# Patient Record
Sex: FEMALE | Race: WHITE
[De-identification: names, ages, dates, MRNs, and addresses within clinical notes are randomized per-mention and may not be internally consistent; named-entity substitution may affect disease eponyms.]

---

## 2022-05-16 ENCOUNTER — HOSPITAL ENCOUNTER (OUTPATIENT)
Dept: HOSPITAL 47 - RADMAMWWP | Age: 77
Discharge: HOME | End: 2022-05-16
Attending: FAMILY MEDICINE
Payer: MEDICARE

## 2022-05-16 DIAGNOSIS — Z12.31: Primary | ICD-10-CM

## 2022-05-16 PROCEDURE — 77063 BREAST TOMOSYNTHESIS BI: CPT

## 2022-05-16 PROCEDURE — 77067 SCR MAMMO BI INCL CAD: CPT

## 2022-06-10 ENCOUNTER — HOSPITAL ENCOUNTER (OUTPATIENT)
Dept: HOSPITAL 47 - LABPAT | Age: 77
Discharge: HOME | End: 2022-06-10
Attending: ORTHOPAEDIC SURGERY
Payer: MEDICARE

## 2022-06-10 DIAGNOSIS — Z01.812: Primary | ICD-10-CM

## 2022-06-10 LAB
ALBUMIN SERPL-MCNC: 4.6 G/DL (ref 3.8–4.9)
ALBUMIN/GLOB SERPL: 2.15 G/DL (ref 1.6–3.17)
ALP SERPL-CCNC: 51 U/L (ref 41–126)
ALT SERPL-CCNC: 27 U/L (ref 8–44)
ANION GAP SERPL CALC-SCNC: 9.9 MMOL/L (ref 10–18)
APTT BLD: 25.5 SEC (ref 22–30)
AST SERPL-CCNC: 44 U/L (ref 13–35)
BUN SERPL-SCNC: 26.9 MG/DL (ref 9–27)
BUN/CREAT SERPL: 26.63 RATIO (ref 12–20)
CALCIUM SPEC-MCNC: 9.7 MG/DL (ref 8.7–10.3)
CHLORIDE SERPL-SCNC: 104 MMOL/L (ref 96–109)
CO2 SERPL-SCNC: 27.8 MMOL/L (ref 20–27.5)
ERYTHROCYTE [DISTWIDTH] IN BLOOD BY AUTOMATED COUNT: 4.71 X 10*6/UL (ref 4.1–5.2)
ERYTHROCYTE [DISTWIDTH] IN BLOOD: 14 % (ref 11.5–14.5)
GLOBULIN SER CALC-MCNC: 2.1 G/DL (ref 1.6–3.3)
GLUCOSE SERPL-MCNC: 134 MG/DL (ref 70–110)
HCT VFR BLD AUTO: 44.3 % (ref 37.2–46.3)
HGB BLD-MCNC: 13.7 G/DL (ref 12–15)
INR PPP: 1.1 (ref ?–1.2)
MCH RBC QN AUTO: 29.1 PG (ref 27–32)
MCHC RBC AUTO-ENTMCNC: 30.9 G/DL (ref 32–37)
MCV RBC AUTO: 94.1 FL (ref 80–97)
NRBC BLD AUTO-RTO: 0 /100 WBCS (ref 0–0)
PH UR: 5 [PH] (ref 5–8)
PLATELET # BLD AUTO: 200 X 10*3/UL (ref 140–440)
POTASSIUM SERPL-SCNC: 4.2 MMOL/L (ref 3.5–5.5)
PROT SERPL-MCNC: 6.7 G/DL (ref 6.2–8.2)
PT BLD: 11.7 SEC (ref 9–12)
SODIUM SERPL-SCNC: 142 MMOL/L (ref 135–145)
SP GR UR: 1.03 (ref 1–1.03)
UROBILINOGEN UR QL: 1
WBC # BLD AUTO: 5.77 X 10*3/UL (ref 4.5–10)

## 2022-06-10 PROCEDURE — 80053 COMPREHEN METABOLIC PANEL: CPT

## 2022-06-10 PROCEDURE — 36415 COLL VENOUS BLD VENIPUNCTURE: CPT

## 2022-06-10 PROCEDURE — 93005 ELECTROCARDIOGRAM TRACING: CPT

## 2022-06-10 PROCEDURE — 85730 THROMBOPLASTIN TIME PARTIAL: CPT

## 2022-06-10 PROCEDURE — 85027 COMPLETE CBC AUTOMATED: CPT

## 2022-06-10 PROCEDURE — 85610 PROTHROMBIN TIME: CPT

## 2022-06-10 PROCEDURE — 81001 URINALYSIS AUTO W/SCOPE: CPT

## 2022-06-10 PROCEDURE — 87070 CULTURE OTHR SPECIMN AEROBIC: CPT

## 2022-06-16 NOTE — MM
Reason for Exam: Screening  (asymptomatic). 





Patient History: 

Menarche at age 14. First Full-Term Pregnancy at age 27. Hysterectomy at age 47. Postmenopausal.

Breast cancer, right, age 58.

Mother had breast cancer. Sister had breast cancer. 





Tissue Density: 

The breast tissue is heterogeneously dense. This may lower the sensitivity of mammography.





Findings: 

Analyzed By CAD. 

Spiculated mass/distortion posterior upper-outer quadrant right breast contains a microclip from

prior biopsy. We suspect that this represents the patient's lumpectomy scar. No priors are available

to confirm. Areas of fat necrosis calcification centrally anterior right breast are benign.

Additional benign loosely grouped/regional round calcifications central left breast middle to

posterior depth.



There is nodular area of asymmetric density superior left MLO view without clear correlate on the CC

view for which further evaluation is recommended. 





Overall Assessment: Incomplete: need additional imaging evaluation, BI-RAD 0





Management: 

Special View Mammogram of the left breast.

Diagnostic Breast Ultrasound of the right breast.

1. Additional views left breast to include spot 3-D MLO and 3-D lateral views. Targeted left breast

ultrasound if any persisting abnormality.

2. Targeted right breast ultrasound upper outer quadrant for the spiculated mass/distortion,

suspected lumpectomy scar (but no priors available to confirm).



Electronically signed and approved by: Juli Thompson M.D. Radiologist

## 2022-06-21 ENCOUNTER — HOSPITAL ENCOUNTER (OUTPATIENT)
Dept: HOSPITAL 47 - OR | Age: 77
Setting detail: OBSERVATION
LOS: 3 days | Discharge: NURSING, REHAB OR LONG-TERM CARE | End: 2022-06-24
Attending: ORTHOPAEDIC SURGERY | Admitting: ORTHOPAEDIC SURGERY
Payer: MEDICARE

## 2022-06-21 DIAGNOSIS — Z80.3: ICD-10-CM

## 2022-06-21 DIAGNOSIS — D50.0: ICD-10-CM

## 2022-06-21 DIAGNOSIS — Z87.891: ICD-10-CM

## 2022-06-21 DIAGNOSIS — Z79.02: ICD-10-CM

## 2022-06-21 DIAGNOSIS — R33.9: ICD-10-CM

## 2022-06-21 DIAGNOSIS — I10: ICD-10-CM

## 2022-06-21 DIAGNOSIS — Z83.3: ICD-10-CM

## 2022-06-21 DIAGNOSIS — Z79.899: ICD-10-CM

## 2022-06-21 DIAGNOSIS — F32.A: ICD-10-CM

## 2022-06-21 DIAGNOSIS — E78.5: ICD-10-CM

## 2022-06-21 DIAGNOSIS — I69.351: ICD-10-CM

## 2022-06-21 DIAGNOSIS — Z90.49: ICD-10-CM

## 2022-06-21 DIAGNOSIS — J44.9: ICD-10-CM

## 2022-06-21 DIAGNOSIS — Z85.3: ICD-10-CM

## 2022-06-21 DIAGNOSIS — M16.11: Primary | ICD-10-CM

## 2022-06-21 DIAGNOSIS — M25.751: ICD-10-CM

## 2022-06-21 DIAGNOSIS — Z90.710: ICD-10-CM

## 2022-06-21 DIAGNOSIS — Z98.41: ICD-10-CM

## 2022-06-21 DIAGNOSIS — E11.9: ICD-10-CM

## 2022-06-21 DIAGNOSIS — Z88.8: ICD-10-CM

## 2022-06-21 DIAGNOSIS — Z96.649: ICD-10-CM

## 2022-06-21 DIAGNOSIS — Z82.49: ICD-10-CM

## 2022-06-21 DIAGNOSIS — Z79.84: ICD-10-CM

## 2022-06-21 DIAGNOSIS — Z79.01: ICD-10-CM

## 2022-06-21 DIAGNOSIS — Z87.440: ICD-10-CM

## 2022-06-21 DIAGNOSIS — Z98.42: ICD-10-CM

## 2022-06-21 DIAGNOSIS — Z80.0: ICD-10-CM

## 2022-06-21 DIAGNOSIS — Z96.641: ICD-10-CM

## 2022-06-21 LAB
GLUCOSE BLD-MCNC: 216 MG/DL (ref 70–110)
GLUCOSE BLD-MCNC: 224 MG/DL (ref 70–110)
GLUCOSE BLD-MCNC: 254 MG/DL (ref 70–110)
GLUCOSE BLD-MCNC: 314 MG/DL (ref 70–110)
GLUCOSE BLD-MCNC: 360 MG/DL (ref 70–110)

## 2022-06-21 PROCEDURE — 86901 BLOOD TYPING SEROLOGIC RH(D): CPT

## 2022-06-21 PROCEDURE — 94640 AIRWAY INHALATION TREATMENT: CPT

## 2022-06-21 PROCEDURE — 88305 TISSUE EXAM BY PATHOLOGIST: CPT

## 2022-06-21 PROCEDURE — 73501 X-RAY EXAM HIP UNI 1 VIEW: CPT

## 2022-06-21 PROCEDURE — 97535 SELF CARE MNGMENT TRAINING: CPT

## 2022-06-21 PROCEDURE — 88311 DECALCIFY TISSUE: CPT

## 2022-06-21 PROCEDURE — 86900 BLOOD TYPING SEROLOGIC ABO: CPT

## 2022-06-21 PROCEDURE — 27130 TOTAL HIP ARTHROPLASTY: CPT

## 2022-06-21 PROCEDURE — 97116 GAIT TRAINING THERAPY: CPT

## 2022-06-21 PROCEDURE — 71046 X-RAY EXAM CHEST 2 VIEWS: CPT

## 2022-06-21 PROCEDURE — 97161 PT EVAL LOW COMPLEX 20 MIN: CPT

## 2022-06-21 PROCEDURE — 86850 RBC ANTIBODY SCREEN: CPT

## 2022-06-21 PROCEDURE — 97530 THERAPEUTIC ACTIVITIES: CPT

## 2022-06-21 PROCEDURE — 97166 OT EVAL MOD COMPLEX 45 MIN: CPT

## 2022-06-21 PROCEDURE — 94760 N-INVAS EAR/PLS OXIMETRY 1: CPT

## 2022-06-21 PROCEDURE — 85025 COMPLETE CBC W/AUTO DIFF WBC: CPT

## 2022-06-21 RX ADMIN — GLIMEPIRIDE SCH MG: 2 TABLET ORAL at 16:15

## 2022-06-21 RX ADMIN — POTASSIUM CHLORIDE SCH MLS: 14.9 INJECTION, SOLUTION INTRAVENOUS at 08:46

## 2022-06-21 RX ADMIN — DOCUSATE SODIUM AND SENNOSIDES SCH EACH: 50; 8.6 TABLET ORAL at 21:11

## 2022-06-21 RX ADMIN — GLIMEPIRIDE SCH MG: 2 TABLET ORAL at 22:25

## 2022-06-21 RX ADMIN — ATORVASTATIN CALCIUM SCH MG: 80 TABLET, FILM COATED ORAL at 21:11

## 2022-06-21 RX ADMIN — INSULIN ASPART SCH UNIT: 100 INJECTION, SOLUTION INTRAVENOUS; SUBCUTANEOUS at 21:11

## 2022-06-21 RX ADMIN — INSULIN ASPART SCH UNIT: 100 INJECTION, SOLUTION INTRAVENOUS; SUBCUTANEOUS at 17:23

## 2022-06-21 NOTE — P.CONS
History of Present Illness





- Reason for Consult


Consult date: 06/21/22


Medical management


Requesting physician: Manuel Grant





- Chief Complaint


Right hip surgery





- History of Present Illness





This is a pleasant 75-year-old patient follows with Dr. Junior.  Chronic stable 

medical conditions include right-sided weakness and right foot drag from prior 

stroke, diabetes, hypertension, hyperlipidemia, osteoarthritis.


Patient has undergone right total hip arthroplasty.  Pain control.  No nausea 

vomiting.  No chest pain or shortness of breath.  Laying in bed.





Review of systems:


GEN.:  Tired


EYES: None


HEENT: None


NECK: None


RESPIRATORY: None


CARDIOVASCULAR: None


GASTROINTESTINAL: None


GENITOURINARY: None


MUSCULOSKELETAL: Joint pains


LYMPHATICS: None


HEMATOLOGICAL: None  


PSYCHIATRY: None


NEUROLOGICAL: Right leg weakness from prior stroke





Past medical history to include:


3 strokes loss and in 2010 resulting in some right-sided weakness right foot 

drag, diabetes, hypertension, hyperlipidemia, right breast cancer, 

osteoarthritis,





Social history:


Ex-smoker.  No alcohol.  .





Family history of colon


CAD, diabetes, breast cancer





Physical examination:


VITAL SIGNS: 98, 69, one or 2 x 67, 95% on 2 L


GENERAL: BMI 31.2, sitting on bed, awake not in distress.


EYES: Pupils equal.  Conjunctiva normal.


HEENT: External appearance of nose and ears normal, oral cavity grossly normal.


NECK: JVD not raised; masses not palpable.


HEART: First and second heart sounds are normal;  no edema.  


LUNGS: Respiratory rate normal; clear to auscultation.  


ABDOMEN: Soft,  nontender, liver spleen not palpable, no masses palpable.  


PSYCH: Alert and oriented x3;  mood  and affect normal.  


MUSCULOSKELETAL:No Clubbing/cyanosis;muscles-grossly intact.  Dressing over the 

right hip incision site.  Evidence of OA.


NEUROLOGICAL: [Cranial nerves grossly intact; no facial asymmetry, right leg 

weakness.


LYMPHATICS: No lymph nodes palpable in the axilla and neck





INVESTIGATIONS, reviewed in the clinical context:


Blood work from 06/10/2022:


White count 5.7 hemoglobin 13.7 platelets 200 potassium 4.2 creatinine 1.0





Assessment and plan:





-Right total hip arthroplasty with anterior approach


DVT prophylaxis.  Pain control.





-Hyperlipidemia


Crestor 40 mg daily at bedtime





-Essential hypertension


Metoprolol 25 mg daily, Zestoretic 20/12.5 one tablet daily





-Diabetes mellitus type 2


Amaryl 2 mg by mouth twice a day, trulicity 0.75 mg subcu on Sunday





-Depression


Celexa 20 mg daily





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


Patient on Plavix





Resume home medications.  Follow Accu-Cheks.  DVT prophylaxis.  Pain control in 

place.  Hold of his Zestoretic in the morning his blood pressure running on the 

lower side.  Care was discussed with the patient and questions answered.


Thank you Dr. Grant





Past Medical History


Past Medical History: Cancer, CVA/TIA, Diabetes Mellitus, Hyperlipidemia, 

Hypertension


Additional Past Medical History / Comment(s): 3 strokes, last 2010 rt sided 

weakness. rt foot drag.  breast CA right.  hx UTI.  some arthritis in her hip.  

enlarged ankles.


History of Any Multi-Drug Resistant Organisms: None Reported


Past Surgical History: Bladder Surgery, Cholecystectomy, Hysterectomy, Joint 

Replacement


Additional Past Surgical History / Comment(s): lt hip replaced.  lumpectomy rt 

breast ( finished oral chemo and radiation).  Bladder suspension.  cataracts 

removed both eyes,.  Right JELENA (6/21/22)


Past Anesthesia/Blood Transfusion Reactions: No Reported Reaction


Past Psychological History: Depression


Smoking Status: Former smoker


Past Alcohol Use History: None Reported


Past Drug Use History: None Reported





- Past Family History


  ** Mother


Family Medical History: Cancer, Coronary Artery Disease (CAD), Diabetes Mellitus


Additional Family Medical History / Comment(s): Breast





  ** Father


Family Medical History: Coronary Artery Disease (CAD), Diabetes Mellitus


Additional Family Medical History / Comment(s): pacemaker





Medications and Allergies


                                Home Medications











 Medication  Instructions  Recorded  Confirmed  Type


 


Citalopram Hydrobromide 20 mg PO DAILY 06/17/22 06/21/22 History





[Citalopram HBr]    


 


Clopidogrel [Plavix] 75 mg PO DAILY 06/17/22 06/17/22 History


 


Dulaglutide [Trulicity] 0.75 mg SQ LORA 06/17/22 06/21/22 History


 


Fenofibrate 160 mg PO DAILY 06/17/22 06/21/22 History


 


Glimepiride [Amaryl] 2 mg PO BID 06/17/22 06/21/22 History


 


Lisinopril-Hctz 20-12.5 mg 1 tab PO DAILY 06/17/22 06/21/22 History





[Zestoretic 20-12.5]    


 


Metoprolol Tartrate 25 mg PO DAILY 06/17/22 06/21/22 History


 


Multivitamin [Multivitamins Adult 1 each PO DAILY 06/17/22 06/17/22 History





Gummies]    


 


Rosuvastatin [Crestor] 40 mg PO HS 06/17/22 06/21/22 History


 


Enoxaparin [Lovenox] 40 mg SQ Q12H 06/21/22 06/21/22 History


 


HYDROcodone/APAP 5-325MG [Norco 1 - 2 tab PO Q6HR PRN 7 Days #30 06/21/22  Rx





5-325] tab   


 


Ondansetron Odt [Zofran Odt] 4 mg PO Q8HR PRN #10 tab 06/21/22  Rx


 


Sennosides-Docusate Sodium 2 tab PO HS PRN #30 tablet 06/21/22  Rx





[Senokot-S]    








                                    Allergies











Allergy/AdvReac Type Severity Reaction Status Date / Time


 


metformin AdvReac  Diarrhea Verified 06/17/22 09:36














Physical Exam


Vitals: 


                                   Vital Signs











  Temp Pulse Pulse Resp BP Pulse Ox


 


 06/21/22 15:12       95


 


 06/21/22 14:00  98.0 F   69   102/67  95


 


 06/21/22 12:57    67  16  111/54  97


 


 06/21/22 12:27    67  16  96/52  97


 


 06/21/22 12:12    69  16  117/56  97


 


 06/21/22 11:57    71  16  152/62  95


 


 06/21/22 11:42    76  16  156/64  94 L


 


 06/21/22 11:27    80  16  155/65  94 L


 


 06/21/22 11:12  96.9 F L   86  16  150/58  91 L


 


 06/21/22 08:39  98.0 F  58 L   16  169/70  94 L








                                Intake and Output











 06/21/22 06/21/22 06/21/22





 06:59 14:59 22:59


 


Intake Total  1251 


 


Output Total  350 


 


Balance  901 


 


Intake:   


 


  IV  1251 


 


Output:   


 


  Estimated Blood Loss  350 


 


Other:   


 


  Weight  77.3 kg 














Results


Labs: 


                  Abnormal Lab Results - Last 24 Hours (Table)











  06/21/22 06/21/22 06/21/22 Range/Units





  08:43 13:10 13:57 


 


POC Glucose (mg/dL)  216 H  314 H  360 H  ()  mg/dL

## 2022-06-21 NOTE — XR
EXAMINATION TYPE: XR Hip Limited RT

 

DATE OF EXAM: 6/21/2022

 

COMPARISON: NONE

 

HISTORY: Postop

 

TECHNIQUE: One view submitted.

 

FINDINGS:

There is postsurgical change in near anatomic alignment.  There is soft tissue edema and emphysema. 

 

IMPRESSION:

1. Postoperative change.  Appears in near-anatomic alignment.

## 2022-06-21 NOTE — FL
EXAMINATION TYPE: FL guidance operating room

 

DATE OF EXAM: 6/21/2022

 

HISTORY: Fluoroscopy  time

 

20 seconds of fluoroscopy provided. 

 

IMPRESSION:

1. Fluoroscopy time.

## 2022-06-21 NOTE — P.OP
Date of Procedure: 06/21/22


Preoperative Diagnosis: 


Severe osteoarthritis right hip


Postoperative Diagnosis: 


Severe osteoarthritis right hip


Procedure(s) Performed: 


Right total hip arthroplasty with a direct anterior approach


Implants: 


Smith [employer] Nephew Polarstem standard size 3 Colar


Villalpando & Nephew R3, 3 hole hemispherical acetabular shell, 48 mm


Smith [employer] Nephew Reflection 6.5 mm cancellus screw, 20 mm, 25 mm


[employer]hew R3, XLPE 20 acetabular liner 


[employer] Nephew Oxinium femoral head 32 m, +0


All components were press-fit.


The articulation is Oxinium on polyethylene.


Anesthesia: GETA


Surgeon: Manuel Grant


Assistant #1: Homero Brunson


Estimated Blood Loss (ml): 350


Pathology: other (Bone and cartilage)


Condition: stable


Disposition: PACU


Indications for Procedure: 


After failure of conservative treatment we discussed the surgical and 

nonsurgical treatment options at length.  Patient wishes to proceed with a total

hip arthroplasty with a direct anterior approach.  Complications specific to 

this procedure were discussed at length, including but not limited to infection,

leg length discrepancy, dislocation, nerve injury, and fracture.  Covid-19 was 

also discussed at length with the patient, and they are aware of the current 

policies and procedures.  The patient was given the option of delaying surgery, 

but they elect to proceed knowing these risks.  Patient is aware of all these 

complications and informed consent was obtained


Operative Findings: 


The operative findings are consistent with severe osteoarthritis of the right 

hip


Description of Procedure: 


Patient was seen and evaluated in the preoperative area and the consent was 

reviewed.  The operative site was marked with a skin marker.  The patient was 

then brought to the operating room and given preoperative antibiotics 

intravenously.  1 g of Tranexamic acid was also given intravenously.  A general 

anesthetic was administered by the anesthesia department.   The patient was then

placed on the H[place] table with the bony prominences well-padded.  The hip area 

was then prepped with a ChloraPrep solution and draped in the usual sterile 

fashion.





A universal timeout was then performed, which confirmed the patient's name, 

surgical site, ALLERGIES, and procedure being performed on the consent.  Next 

the incision site was located at 1 cm distal and 2 cm lateral to the anterior 

superior iliac spine.  The skin and subcutaneous tissues were sharply incised.  

Incision was carefully dissected down to the fascia overlying the tensor fascia 

aisha muscle.  This fascia was then incised in line with the incision.  Care was 

taken to stay laterally in order to avoid injuring the lateral femoral cutaneous

nerve.  Next, using blunt finger dissection, the tensor fascia aisha muscle was 

dissected off its investing fascia.  The muscle was then carefully retracted 

laterally with a cobra retractor over the lateral neck of the femur.  Next, the 

circumflex vessels were identified and cauterized using the AquaMantis device.  

The anterior hip capsule was then exposed.  The capsule was then opened and an 

inverted T fashion.  Cobra retractors were then placed intracapsularly.  The 

retractors were maintained intracapsular throughout the procedure.  The proximal

femur was then visualized.  Fluoroscopic x-rays were then taken in order to 

evaluate the preoperative leg lengths.  A small amount of traction was placed on

the leg.





The femoral neck was then osteotomized at the appropriate level above the lesser

trochanter.  A small wedge of bone was then removed from the remaining femoral 

head.  Next, using a corkscrew the femoral head was removed from the acetabulum.

 On gross visual inspection, the femoral head had complete loss of articular 

cartilage and multiple periarticular osteophytes.  The femoral head was then 

measured.  Attention was then turned to the acetabulum.





The acetabulum was exposed and any remaining labrum was excised.  Sequential 

reaming of the acetabulum was performed using fluoroscopic guidance until there 

was a good bed of bleeding cancellus bone.  When the appropriate size was 

reached, a trial was then placed.  The position and fit of the trial was checked

with fluoroscopy.  The trial was then removed.  Then, using fluoroscopic 

guidance, the final implant was impacted at 20 of anteversion and 40 of 

abduction, and fully seated in the acetabulum.  2 screws were then placed in the

acetabulum.  Again fluoroscopy was used to check position of the screws.  Next, 

the liner was then impacted, with a 20 elevated liner located in the anterior 

superior quadrant.  Component locking was confirmed.





Attention was then directed to the femur.  With the aid of the Houlton table, the 

femur was externally rotated to approximately 130, extended, and adducted under

the opposite leg.  A side hook was then placed under the proximal femur, and the

side hook elevator was used to elevate the proximal femur while releasing the 

capsule.  Retractors were then placed.  A capsular release was performed, as 

well as a release of the conjoined tendon, which afforded excellent 

visualization of the proximal femur.  Next, a box osteotome was used to 

lateralize the proximal femur.  A  was then used to locate the 

femoral canal.  Sequential broaching was then performed with appropriate size 

which afforded excellent fixation in the proximal femur.  A trial was then 

placed with appropriate head and neck, and the hip was gently reduced with the 

aid of the Houlton table.  Fluoroscopy was then used to check position of the 

components, as well as to ensure equal leg lengths.  The hip was then gently 

dislocated and the trials were then removed.  Final implants were then impacted 

and the hip was again reduced.  Final fluoroscopic x-rays confirmed that the 

components were in anatomic position, as well as equal leg lengths.  The hip was

also taken through range of motion, and found to be stable.





The hip was then copiously irrigated with antibiotic solution with pulsatile 

lavage.  The hip was then irrigated with Irrisept solution.  The soft tissues 

were then injected with a ropivacaine solution.  A second dose of 1 g of 

Tranexamic acid was also given intravenously. 





The fascia was then closed with 2-0 strata fix suture.  The subcutaneous tissue 

was closed with 3-0 Vicryl.  The subcuticular tissue was closed with 3-0 strata 

fix suture.  The skin was then closed with Exofin skin glue.  After the glue and

dried, and Optifoam silver impregnated dressing was applied.  The patient was 

then transferred to the recovery room in stable condition.





The assistant  PATRICIO Arshad was required due to the complexity of surgery, 

and the need for skilled surgical assistant for positioning, draping, exposure, 

retraction, and closure of the wound.

## 2022-06-21 NOTE — XR
EXAMINATION TYPE: XR Hip Limited RT

 

DATE OF EXAM: 6/21/2022

 

COMPARISON: NONE

 

HISTORY: Pain

 

TECHNIQUE: One view submitted.

 

FINDINGS:

There is postsurgical change in near anatomic alignment.  There is soft tissue edema and emphysema. 

 

IMPRESSION:

1. Postoperative change.  Appears in near-anatomic alignment.

## 2022-06-22 LAB
BASOPHILS # BLD AUTO: 0.02 X 10*3/UL (ref 0–0.1)
BASOPHILS NFR BLD AUTO: 0.2 %
EOSINOPHIL # BLD AUTO: 0 X 10*3/UL (ref 0.04–0.35)
EOSINOPHIL NFR BLD AUTO: 0 %
ERYTHROCYTE [DISTWIDTH] IN BLOOD BY AUTOMATED COUNT: 3.49 X 10*6/UL (ref 4.1–5.2)
ERYTHROCYTE [DISTWIDTH] IN BLOOD: 14.5 % (ref 11.5–14.5)
GLUCOSE BLD-MCNC: 206 MG/DL (ref 70–110)
GLUCOSE BLD-MCNC: 266 MG/DL (ref 70–110)
GLUCOSE BLD-MCNC: 292 MG/DL (ref 70–110)
GLUCOSE BLD-MCNC: 303 MG/DL (ref 70–110)
HCT VFR BLD AUTO: 32.5 % (ref 37.2–46.3)
HGB BLD-MCNC: 10.4 G/DL (ref 12–15)
IMM GRANULOCYTES BLD QL AUTO: 0.5 %
LYMPHOCYTES # SPEC AUTO: 0.91 X 10*3/UL (ref 0.9–5)
LYMPHOCYTES NFR SPEC AUTO: 8.3 %
MCH RBC QN AUTO: 29.8 PG (ref 27–32)
MCHC RBC AUTO-ENTMCNC: 32 G/DL (ref 32–37)
MCV RBC AUTO: 93.1 FL (ref 80–97)
MONOCYTES # BLD AUTO: 0.85 X 10*3/UL (ref 0.2–1)
MONOCYTES NFR BLD AUTO: 7.7 %
NEUTROPHILS # BLD AUTO: 9.17 X 10*3/UL (ref 1.8–7.7)
NEUTROPHILS NFR BLD AUTO: 83.3 %
NRBC BLD AUTO-RTO: 0 /100 WBCS (ref 0–0)
PLATELET # BLD AUTO: 176 X 10*3/UL (ref 140–440)
WBC # BLD AUTO: 11.01 X 10*3/UL (ref 4.5–10)

## 2022-06-22 RX ADMIN — Medication SCH MG: at 20:06

## 2022-06-22 RX ADMIN — HYDROCODONE BITARTRATE AND ACETAMINOPHEN PRN EACH: 5; 325 TABLET ORAL at 20:07

## 2022-06-22 RX ADMIN — HYDROCODONE BITARTRATE AND ACETAMINOPHEN PRN EACH: 5; 325 TABLET ORAL at 13:18

## 2022-06-22 RX ADMIN — INSULIN ASPART SCH UNIT: 100 INJECTION, SOLUTION INTRAVENOUS; SUBCUTANEOUS at 08:57

## 2022-06-22 RX ADMIN — METOPROLOL TARTRATE SCH MG: 25 TABLET, FILM COATED ORAL at 08:57

## 2022-06-22 RX ADMIN — ENOXAPARIN SODIUM SCH MG: 30 INJECTION SUBCUTANEOUS at 19:53

## 2022-06-22 RX ADMIN — ONDANSETRON PRN MG: 2 INJECTION INTRAMUSCULAR; INTRAVENOUS at 17:12

## 2022-06-22 RX ADMIN — POTASSIUM CHLORIDE SCH: 14.9 INJECTION, SOLUTION INTRAVENOUS at 08:46

## 2022-06-22 RX ADMIN — ONDANSETRON PRN MG: 2 INJECTION INTRAMUSCULAR; INTRAVENOUS at 08:46

## 2022-06-22 RX ADMIN — INSULIN ASPART SCH UNIT: 100 INJECTION, SOLUTION INTRAVENOUS; SUBCUTANEOUS at 22:35

## 2022-06-22 RX ADMIN — ONDANSETRON PRN MG: 2 INJECTION INTRAMUSCULAR; INTRAVENOUS at 01:53

## 2022-06-22 RX ADMIN — ATORVASTATIN CALCIUM SCH MG: 80 TABLET, FILM COATED ORAL at 19:53

## 2022-06-22 RX ADMIN — THERA TABS SCH EACH: TAB at 08:57

## 2022-06-22 RX ADMIN — HYDROCODONE BITARTRATE AND ACETAMINOPHEN PRN EACH: 5; 325 TABLET ORAL at 07:47

## 2022-06-22 RX ADMIN — FENOFIBRATE SCH MG: 160 TABLET ORAL at 08:57

## 2022-06-22 RX ADMIN — DOCUSATE SODIUM AND SENNOSIDES SCH EACH: 50; 8.6 TABLET ORAL at 19:53

## 2022-06-22 RX ADMIN — INSULIN ASPART SCH UNIT: 100 INJECTION, SOLUTION INTRAVENOUS; SUBCUTANEOUS at 11:47

## 2022-06-22 RX ADMIN — CITALOPRAM HYDROBROMIDE SCH MG: 20 TABLET ORAL at 08:57

## 2022-06-22 RX ADMIN — INSULIN ASPART SCH UNIT: 100 INJECTION, SOLUTION INTRAVENOUS; SUBCUTANEOUS at 17:11

## 2022-06-22 RX ADMIN — GLIMEPIRIDE SCH MG: 2 TABLET ORAL at 19:53

## 2022-06-22 RX ADMIN — TAMSULOSIN HYDROCHLORIDE SCH MG: 0.4 CAPSULE ORAL at 11:47

## 2022-06-22 RX ADMIN — GLIMEPIRIDE SCH MG: 2 TABLET ORAL at 09:26

## 2022-06-22 NOTE — P.PN
Subjective


Progress Note Date: 06/22/22


Principal diagnosis: 


Primary osteoarthritis right hip.


Status post total right hip arthroplasty with direct anterior approach.





This is a 76-year-old female who is postoperative day #1 status post total hip 

arthroplasty was direct anterior approach.  She states that she has been unable 

to void.  Patient states that she has only voided about 25 cc last shift.  They 

have not yet straight cathed her.  Vital signs are stable.  She is afebrile.








Objective





- Vital Signs


Vital signs: 


                                   Vital Signs











Temp  98.4 F   06/22/22 01:13


 


Pulse  80   06/22/22 01:13


 


Resp  20   06/22/22 01:13


 


BP  111/65   06/22/22 01:13


 


Pulse Ox  96   06/22/22 01:13


 


FiO2      








                                 Intake & Output











 06/21/22 06/22/22 06/22/22





 18:59 06:59 18:59


 


Intake Total 1251  


 


Output Total 350  400


 


Balance 901  -400


 


Weight 77.3 kg  


 


Intake:   


 


  IV 1251  


 


Output:   


 


  Urine   400


 


    Straight   400


 


  Estimated Blood Loss 350  


 


Other:   


 


  # Voids 1  














- Exam


This is a pleasant 76-year-old female in no acute distress.  She is alert and 

oriented 3.  Exam of the right hip reveals that her dressings clean, dry and 

intact.  She has full foot and ankle motion without difficulty or pain.  

Neurovascular status to the right lower extremity is intact.








- Labs


Labs: 


                  Abnormal Lab Results - Last 24 Hours (Table)











  06/21/22 06/21/22 06/21/22 Range/Units





  08:43 13:10 13:57 


 


POC Glucose (mg/dL)  216 H  314 H  360 H  ()  mg/dL














  06/21/22 06/21/22 06/22/22 Range/Units





  17:06 20:16 06:55 


 


POC Glucose (mg/dL)  254 H  224 H  206 H  ()  mg/dL














Assessment and Plan


(1) Primary localized osteoarthritis of right hip


Current Visit: Yes   Status: Acute   Code(s): M16.11 - UNILATERAL PRIMARY 

OSTEOARTHRITIS, RIGHT HIP   SNOMED Code(s): 340528265526849


   





(2) Status post total hip replacement, right


Current Visit: Yes   Status: Acute   Code(s): Z96.641 - PRESENCE OF RIGHT ARTIF

ICIAL HIP JOINT   SNOMED Code(s): 138580644183

## 2022-06-22 NOTE — P.PN
Progress Note - Text


Progress Note Date: 06/22/22





- Chief Complaint


Right hip surgery








Hospital course


This is a pleasant 75-year-old patient follows with Dr. Junior.  Chronic stable 

medical conditions include right-sided weakness and right foot drag from prior 

stroke, diabetes, hypertension, hyperlipidemia, osteoarthritis.


Patient has undergone right total hip arthroplasty.  Pain control.  No nausea 

vomiting.  No chest pain or shortness of breath.  Laying in bed.


June 22: Patient had urinary retention for which to check were Fishman catheter.  

Discontinued.  Flomax added.  Spoke to Shannan Hernandez from orthopedic team.  Plavix

has been resumed.  For DVT prophylaxis we will add subcu Lovenox.  Care was 

discussed with the family at the bedside.





Active Medications





Hydrocodone Bitart/Acetaminophen (Hydrocodone/Apap 5-325mg 1 Each Tab)  1 each 

PO Q6HR PRN


   PRN Reason: Pain Scale 1 to 5


   Stop: 07/21/22 11:08


   Last Admin: 06/22/22 13:18 Dose:  1 each


   


Hydrocodone Bitart/Acetaminophen (Hydrocodone/Apap 5-325mg 1 Each Tab)  2 each 

PO Q6HR PRN


   PRN Reason: Pain Scale 6 to 10


   Stop: 07/21/22 11:08


Atorvastatin Calcium (Atorvastatin 80 Mg Tab)  80 mg PO HS [facility]


   Last Admin: 06/21/22 21:11 Dose:  80 mg


   


Citalopram Hydrobromide (Citalopram Hydrobromide 20 Mg Tab)  20 mg PO DAILY [facility]


   Last Admin: 06/22/22 08:57 Dose:  20 mg


   


Enoxaparin Sodium (Enoxaparin 30 Mg/0.3 Ml Syringe)  30 mg SQ Q12HR [facility]


Fenofibrate (Fenofibrate 160 Mg Tab)  160 mg PO DAILY [facility]


   Last Admin: 06/22/22 08:57 Dose:  160 mg


   


Glimepiride (Glimepiride 2 Mg Tab)  2 mg PO BID [facility]


   Last Admin: 06/22/22 09:26 Dose:  2 mg


   


Hydromorphone HCl (Hydromorphone 0.5 Mg/0.5 Ml Syringe)  0.125 mg IVP Q3HR PRN


   PRN Reason: Pain Scale 1 to 3


   Stop: 07/21/22 11:08


Hydromorphone HCl (Hydromorphone 0.5 Mg/0.5 Ml Syringe)  0.5 mg IVP Q3HR PRN


   PRN Reason: Pain Scale 7 to 10


   Stop: 07/21/22 11:08


   Last Admin: 06/22/22 17:12 Dose:  0.5 mg


   


Hydromorphone HCl (Hydromorphone 0.5 Mg/0.5 Ml Syringe)  0.25 mg IVP Q3HR PRN


   PRN Reason: Pain Scale 4 to 6


   Stop: 07/21/22 11:08


Hydroxyzine Pamoate (Hydroxyzine Pamoate 25 Mg Cap)  25 mg PO Q4HR PRN


   PRN Reason: Nausea, Anxiety, Pain Control


   Stop: 07/21/22 11:08


   Last Admin: 06/22/22 15:02 Dose:  25 mg


   


Lactated Ringer's (Lactated Ringers)  1,000 mls @ 20 mls/hr IV .Q24H [facility]


   Stop: 07/21/22 05:56


   Last Admin: 06/22/22 08:46 Dose:  Not Given


   


Insulin Aspart (Insulin Aspart (Novolog) 100 Unit/Ml Vial)  0 unit SQ ACHS [facility]; 

Protocol


   Last Admin: 06/22/22 17:11 Dose:  4 unit


   


Lidocaine HCl (Lidocaine 1% (10mg/Ml) For Iv Start)  0.1 ml INTRADERMA PER 

PROTOCOL PRN


   PRN Reason: IV Start


   Stop: 07/21/22 05:56


Metoprolol Tartrate (Metoprolol Tartrate 25 Mg Tab)  25 mg PO DAILY [facility]


   Last Admin: 06/22/22 08:57 Dose:  25 mg


   


Multivitamins (Multivitamins, Thera 1 Each Tab)  1 each PO DAILY [facility]


   Last Admin: 06/22/22 08:57 Dose:  1 each


   


Naloxone HCl (Naloxone 0.4 Mg/Ml 1 Ml Vial)  0.2 mg IV Q2M PRN


   PRN Reason: Opioid Reversal


   Stop: 07/21/22 11:08


Ondansetron HCl (Ondansetron 4 Mg/2 Ml Vial)  4 mg IVP Q6HR PRN


   PRN Reason: Nausea And Vomiting


   Stop: 07/21/22 11:08


   Last Admin: 06/22/22 17:12 Dose:  4 mg


   


Senna/Docusate Sodium (Sennosides-Docusate Sodium 1 Each Tab)  2 each PO HS [facility]


   Stop: 07/21/22 21:01


   Last Admin: 06/21/22 21:11 Dose:  2 each


   


Tamsulosin HCl (Tamsulosin 0.4 Mg Cap.Er.24h)  0.4 mg PO PC-[id number] [facility]


   Last Admin: 06/22/22 11:47 Dose:  0.4 mg


   











Past medical history to include:


3 strokes loss and in 2010 resulting in some right-sided weakness right foot 

drag, diabetes, hypertension, hyperlipidemia, right breast cancer, 

osteoarthritis,





Social history:


Ex-smoker.  No alcohol.  .





Family history of colon


CAD, diabetes, breast cancer





Physical examination:


VITAL SIGNS: 98.2, 82, 17, 120/70, 90% room air


GENERAL: A Chito in bed awake, comfortable


EYES: Pupils equal.  Conjunctiva normal.


HEENT: External appearance of nose and ears normal, oral cavity grossly normal.


NECK: JVD not raised; masses not palpable.


HEART: First and second heart sounds are normal;  no edema.  


LUNGS: Respiratory rate normal; clear to auscultation.  


ABDOMEN: Soft,  nontender, liver spleen not palpable, no masses palpable.  


PSYCH: Alert and oriented x3;  mood  and affect normal.  


MUSCULOSKELETAL:No Clubbing/cyanosis;muscles-grossly intact.  Dressing over the 

right hip incision site.  Evidence of OA.


NEUROLOGICAL: [Cranial nerves grossly intact; no facial asymmetry, right leg 

weakness.








INVESTIGATIONS, reviewed in the clinical context:


June 22: White count 11.0 hemoglobin 10.4


Blood work from 06/10/2022:


White count 5.7 hemoglobin 13.7 platelets 200 potassium 4.2 creatinine 1.0





Assessment and plan:





-Right total hip arthroplasty with anterior approach


DVT prophylaxis.  Pain control.





-Hyperlipidemia


Crestor 40 mg daily at bedtime





-Essential hypertension


Metoprolol 25 mg daily, Zestoretic 20/12.5 one tablet daily





-Diabetes mellitus type 2


Amaryl 2 mg by mouth twice a day, trulicity 0.75 mg subcu on Sunday





-Bladder dysfunction with retention


Flomax 0.4 mg daily started





-Depression


Celexa 20 mg daily





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


Resume Plavix





-Acute postprocedure blood loss anemia


Ferrous sulfate 1 tablet twice a day





Ferrous sulfate 325 mg twice a day.  Subcu Lovenox 30 mg every 12 for DVT p

rophylaxis.  Resume Plavix.  Bladder dysfunction with urinary retention.  Start 

Flomax.  Discussed with Shannan Hernandez from orthopedics.  Also discussed with the 

patient and family at the bedside.  I spent about 40 minutes with over 25 

minutes of discussion.


Thank you Dr. Grant

## 2022-06-23 LAB
GLUCOSE BLD-MCNC: 200 MG/DL (ref 70–110)
GLUCOSE BLD-MCNC: 305 MG/DL (ref 70–110)
GLUCOSE BLD-MCNC: 318 MG/DL (ref 70–110)
GLUCOSE BLD-MCNC: 322 MG/DL (ref 70–110)

## 2022-06-23 RX ADMIN — ENOXAPARIN SODIUM SCH MG: 30 INJECTION SUBCUTANEOUS at 20:37

## 2022-06-23 RX ADMIN — HYDROCODONE BITARTRATE AND ACETAMINOPHEN PRN EACH: 5; 325 TABLET ORAL at 17:32

## 2022-06-23 RX ADMIN — Medication SCH MG: at 07:45

## 2022-06-23 RX ADMIN — FENOFIBRATE SCH MG: 160 TABLET ORAL at 07:45

## 2022-06-23 RX ADMIN — CITALOPRAM HYDROBROMIDE SCH MG: 20 TABLET ORAL at 07:45

## 2022-06-23 RX ADMIN — ATORVASTATIN CALCIUM SCH MG: 80 TABLET, FILM COATED ORAL at 20:37

## 2022-06-23 RX ADMIN — INSULIN ASPART SCH UNIT: 100 INJECTION, SOLUTION INTRAVENOUS; SUBCUTANEOUS at 07:44

## 2022-06-23 RX ADMIN — DOCUSATE SODIUM AND SENNOSIDES SCH EACH: 50; 8.6 TABLET ORAL at 20:37

## 2022-06-23 RX ADMIN — GLIMEPIRIDE SCH MG: 2 TABLET ORAL at 12:45

## 2022-06-23 RX ADMIN — THERA TABS SCH EACH: TAB at 07:45

## 2022-06-23 RX ADMIN — INSULIN ASPART SCH UNIT: 100 INJECTION, SOLUTION INTRAVENOUS; SUBCUTANEOUS at 12:45

## 2022-06-23 RX ADMIN — HYDROCODONE BITARTRATE AND ACETAMINOPHEN PRN EACH: 5; 325 TABLET ORAL at 07:10

## 2022-06-23 RX ADMIN — GLIMEPIRIDE SCH MG: 2 TABLET ORAL at 22:09

## 2022-06-23 RX ADMIN — INSULIN ASPART SCH UNIT: 100 INJECTION, SOLUTION INTRAVENOUS; SUBCUTANEOUS at 17:29

## 2022-06-23 RX ADMIN — METOPROLOL TARTRATE SCH MG: 25 TABLET, FILM COATED ORAL at 07:45

## 2022-06-23 RX ADMIN — TAMSULOSIN HYDROCHLORIDE SCH MG: 0.4 CAPSULE ORAL at 07:45

## 2022-06-23 RX ADMIN — ENOXAPARIN SODIUM SCH MG: 30 INJECTION SUBCUTANEOUS at 07:45

## 2022-06-23 RX ADMIN — POTASSIUM CHLORIDE SCH: 14.9 INJECTION, SOLUTION INTRAVENOUS at 08:17

## 2022-06-23 RX ADMIN — Medication SCH MG: at 17:29

## 2022-06-23 RX ADMIN — INSULIN ASPART SCH UNIT: 100 INJECTION, SOLUTION INTRAVENOUS; SUBCUTANEOUS at 23:42

## 2022-06-23 NOTE — P.PN
Progress Note - Text


Progress Note Date: 06/23/22





- Chief Complaint


Right hip surgery








Hospital course


This is a pleasant 75-year-old patient follows with Dr. Junior.  Chronic stable 

medical conditions include right-sided weakness and right foot drag from prior 

stroke, diabetes, hypertension, hyperlipidemia, osteoarthritis.


Patient has undergone right total hip arthroplasty.  Pain control.  No nausea 

vomiting.  No chest pain or shortness of breath.  Laying in bed.


June 22: Patient had urinary retention for which to check were Fishman catheter.  

Discontinued.  Flomax added.  Spoke to Shannan Hernandez from orthopedic team.  Plavix

has been resumed.  For DVT prophylaxis we will add subcu Lovenox.  Care was 

discussed with the family at the bedside.


June 23: Patient started on Flomax yesterday.  Making urine.  Oral intake fair. 

Pain control.





Active Medications





Hydrocodone Bitart/Acetaminophen (Hydrocodone/Apap 5-325mg 1 Each Tab)  1 each 

PO Q6HR PRN


   PRN Reason: Pain Scale 1 to 5


   Stop: 07/21/22 11:08


   Last Admin: 06/23/22 07:10 Dose:  1 each


   


Hydrocodone Bitart/Acetaminophen (Hydrocodone/Apap 5-325mg 1 Each Tab)  2 each 

PO Q6HR PRN


   PRN Reason: Pain Scale 6 to 10


   Stop: 07/21/22 11:08


Atorvastatin Calcium (Atorvastatin 80 Mg Tab)  80 mg PO HS [facility]


   Last Admin: 06/22/22 19:53 Dose:  80 mg


   


Citalopram Hydrobromide (Citalopram Hydrobromide 20 Mg Tab)  20 mg PO DAILY [facility]


   Last Admin: 06/23/22 07:45 Dose:  20 mg


   


Enoxaparin Sodium (Enoxaparin 30 Mg/0.3 Ml Syringe)  30 mg SQ Q12HR [facility]


   Last Admin: 06/23/22 07:45 Dose:  30 mg


   


Fenofibrate (Fenofibrate 160 Mg Tab)  160 mg PO DAILY [facility]


   Last Admin: 06/23/22 07:45 Dose:  160 mg


   


Ferrous Sulfate (Ferrous Sulfate 325 Mg Tab)  325 mg PO BID-W/MEALS [facility]


   Last Admin: 06/23/22 07:45 Dose:  325 mg


   


Glimepiride (Glimepiride 2 Mg Tab)  2 mg PO BID [facility]


   Last Admin: 06/22/22 19:53 Dose:  2 mg


   


Hydromorphone HCl (Hydromorphone 0.5 Mg/0.5 Ml Syringe)  0.125 mg IVP Q3HR PRN


   PRN Reason: Pain Scale 1 to 3


   Stop: 07/21/22 11:08


Hydromorphone HCl (Hydromorphone 0.5 Mg/0.5 Ml Syringe)  0.5 mg IVP Q3HR PRN


   PRN Reason: Pain Scale 7 to 10


   Stop: 07/21/22 11:08


   Last Admin: 06/22/22 17:12 Dose:  0.5 mg


   


Hydromorphone HCl (Hydromorphone 0.5 Mg/0.5 Ml Syringe)  0.25 mg IVP Q3HR PRN


   PRN Reason: Pain Scale 4 to 6


   Stop: 07/21/22 11:08


Hydroxyzine Pamoate (Hydroxyzine Pamoate 25 Mg Cap)  25 mg PO Q4HR PRN


   PRN Reason: Nausea, Anxiety, Pain Control


   Stop: 07/21/22 11:08


   Last Admin: 06/22/22 15:02 Dose:  25 mg


   


Lactated Ringer's (Lactated Ringers)  1,000 mls @ 20 mls/hr IV .Q24H [facility]


   Stop: 07/21/22 05:56


   Last Admin: 06/23/22 08:17 Dose:  Not Given


   


Insulin Aspart (Insulin Aspart (Novolog) 100 Unit/Ml Vial)  0 unit SQ ACHS [facility]; 

Protocol


   Last Admin: 06/23/22 07:44 Dose:  2 unit


   


Lidocaine HCl (Lidocaine 1% (10mg/Ml) For Iv Start)  0.1 ml INTRADERMA PER 

PROTOCOL PRN


   PRN Reason: IV Start


   Stop: 07/21/22 05:56


Metoprolol Tartrate (Metoprolol Tartrate 25 Mg Tab)  25 mg PO DAILY [facility]


   Last Admin: 06/23/22 07:45 Dose:  25 mg


   


Multivitamins (Multivitamins, Thera 1 Each Tab)  1 each PO DAILY [facility]


   Last Admin: 06/23/22 07:45 Dose:  1 each


   


Naloxone HCl (Naloxone 0.4 Mg/Ml 1 Ml Vial)  0.2 mg IV Q2M PRN


   PRN Reason: Opioid Reversal


   Stop: 07/21/22 11:08


Ondansetron HCl (Ondansetron 4 Mg/2 Ml Vial)  4 mg IVP Q6HR PRN


   PRN Reason: Nausea And Vomiting


   Stop: 07/21/22 11:08


   Last Admin: 06/22/22 17:12 Dose:  4 mg


   


Senna/Docusate Sodium (Sennosides-Docusate Sodium 1 Each Tab)  2 each PO HS [facility]


   Stop: 07/21/22 21:01


   Last Admin: 06/22/22 19:53 Dose:  2 each


   


Tamsulosin HCl (Tamsulosin 0.4 Mg Cap.Er.24h)  0.4 mg PO -BRKFST [facility]


   Last Admin: 06/23/22 07:45 Dose:  0.4 mg


   








Past medical history to include:


3 strokes loss and in 2010 resulting in some right-sided weakness right foot 

drag, diabetes, hypertension, hyperlipidemia, right breast cancer, 

osteoarthritis,





Social history:


Ex-smoker.  No alcohol.  .





Family history of colon


CAD, diabetes, breast cancer





Physical examination:


VITAL SIGNS: 98.8, 96, 16, 176/62, 97% on 2 L


GENERAL: Laying in bed, awake, comfortable


EYES: Pupils equal.  Conjunctiva normal.


HEENT: External appearance of nose and ears normal, oral cavity grossly normal.


NECK: JVD not raised; masses not palpable.


HEART: First and second heart sounds are normal;  no edema.  


LUNGS: Respiratory rate normal; clear to auscultation.  


ABDOMEN: Soft,  nontender, liver spleen not palpable, no masses palpable.  


PSYCH: Alert and oriented x3;  mood  and affect normal.  


MUSCULOSKELETAL:No Clubbing/cyanosis;muscles-grossly intact.  Dressing over the 

right hip incision site.  Evidence of OA.


NEUROLOGICAL: [Cranial nerves grossly intact; no facial asymmetry, right leg wea

kness.








INVESTIGATIONS, reviewed in the clinical context:


June 22: White count 11.0 hemoglobin 10.4


Blood work from 06/10/2022:


White count 5.7 hemoglobin 13.7 platelets 200 potassium 4.2 creatinine 1.0





Assessment and plan:





-Right total hip arthroplasty with anterior approach


DVT prophylaxis.  Pain control.





-Hyperlipidemia


Crestor 40 mg daily at bedtime





-Essential hypertension


Metoprolol 25 mg daily, Zestoretic 20/12.5 one tablet daily





-Diabetes mellitus type 2


Amaryl 2 mg by mouth twice a day, trulicity 0.75 mg subcu on Sunday





-Bladder dysfunction with retention: Improved


Flomax 0.4 mg daily 





-Depression


Celexa 20 mg daily





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


 Plavix





-Acute postprocedure blood loss anemia


Ferrous sulfate 1 tablet twice a day





Stable.  Continue current medication treatment plan.  Pending authorization to 

go to rehab.


Thank you Dr. Grant

## 2022-06-24 VITALS — DIASTOLIC BLOOD PRESSURE: 64 MMHG | RESPIRATION RATE: 19 BRPM | SYSTOLIC BLOOD PRESSURE: 138 MMHG | TEMPERATURE: 98.5 F

## 2022-06-24 VITALS — HEART RATE: 78 BPM

## 2022-06-24 LAB
BASOPHILS # BLD AUTO: 0.07 X 10*3/UL (ref 0–0.1)
BASOPHILS NFR BLD AUTO: 1 %
EOSINOPHIL # BLD AUTO: 0.11 X 10*3/UL (ref 0.04–0.35)
EOSINOPHIL NFR BLD AUTO: 1.5 %
ERYTHROCYTE [DISTWIDTH] IN BLOOD BY AUTOMATED COUNT: 3.13 X 10*6/UL (ref 4.1–5.2)
ERYTHROCYTE [DISTWIDTH] IN BLOOD: 14.2 % (ref 11.5–14.5)
GLUCOSE BLD-MCNC: 198 MG/DL (ref 70–110)
GLUCOSE BLD-MCNC: 253 MG/DL (ref 70–110)
GLUCOSE BLD-MCNC: 266 MG/DL (ref 70–110)
GLUCOSE BLD-MCNC: 300 MG/DL (ref 70–110)
HCT VFR BLD AUTO: 29.1 % (ref 37.2–46.3)
HGB BLD-MCNC: 9.3 G/DL (ref 12–15)
IMM GRANULOCYTES BLD QL AUTO: 0.3 %
LYMPHOCYTES # SPEC AUTO: 1.22 X 10*3/UL (ref 0.9–5)
LYMPHOCYTES NFR SPEC AUTO: 17.1 %
MCH RBC QN AUTO: 29.7 PG (ref 27–32)
MCHC RBC AUTO-ENTMCNC: 32 G/DL (ref 32–37)
MCV RBC AUTO: 93 FL (ref 80–97)
MONOCYTES # BLD AUTO: 0.69 X 10*3/UL (ref 0.2–1)
MONOCYTES NFR BLD AUTO: 9.7 %
NEUTROPHILS # BLD AUTO: 5.03 X 10*3/UL (ref 1.8–7.7)
NEUTROPHILS NFR BLD AUTO: 70.4 %
NRBC BLD AUTO-RTO: 0 /100 WBCS (ref 0–0)
PLATELET # BLD AUTO: 154 X 10*3/UL (ref 140–440)
WBC # BLD AUTO: 7.14 X 10*3/UL (ref 4.5–10)

## 2022-06-24 RX ADMIN — THERA TABS SCH EACH: TAB at 08:40

## 2022-06-24 RX ADMIN — INSULIN ASPART SCH UNIT: 100 INJECTION, SOLUTION INTRAVENOUS; SUBCUTANEOUS at 08:40

## 2022-06-24 RX ADMIN — POTASSIUM CHLORIDE SCH: 14.9 INJECTION, SOLUTION INTRAVENOUS at 08:38

## 2022-06-24 RX ADMIN — IPRATROPIUM BROMIDE AND ALBUTEROL SULFATE SCH: .5; 3 SOLUTION RESPIRATORY (INHALATION) at 12:30

## 2022-06-24 RX ADMIN — IPRATROPIUM BROMIDE AND ALBUTEROL SULFATE SCH ML: .5; 3 SOLUTION RESPIRATORY (INHALATION) at 12:30

## 2022-06-24 RX ADMIN — TAMSULOSIN HYDROCHLORIDE SCH MG: 0.4 CAPSULE ORAL at 08:40

## 2022-06-24 RX ADMIN — INSULIN ASPART SCH UNIT: 100 INJECTION, SOLUTION INTRAVENOUS; SUBCUTANEOUS at 12:55

## 2022-06-24 RX ADMIN — FENOFIBRATE SCH MG: 160 TABLET ORAL at 08:40

## 2022-06-24 RX ADMIN — ENOXAPARIN SODIUM SCH MG: 30 INJECTION SUBCUTANEOUS at 08:40

## 2022-06-24 RX ADMIN — METOPROLOL TARTRATE SCH MG: 25 TABLET, FILM COATED ORAL at 08:40

## 2022-06-24 RX ADMIN — GLIMEPIRIDE SCH MG: 2 TABLET ORAL at 08:43

## 2022-06-24 RX ADMIN — Medication SCH MG: at 08:40

## 2022-06-24 RX ADMIN — CITALOPRAM HYDROBROMIDE SCH MG: 20 TABLET ORAL at 08:40

## 2022-06-24 RX ADMIN — HYDROCODONE BITARTRATE AND ACETAMINOPHEN PRN EACH: 5; 325 TABLET ORAL at 05:46

## 2022-06-24 NOTE — XR
EXAMINATION TYPE: XR chest 2V

 

DATE OF EXAM: 6/24/2022

 

COMPARISON: NONE

 

HISTORY: Shortness of breath

 

TECHNIQUE:  Frontal and lateral views of the chest are obtained.

 

FINDINGS:

 

Scattered senescent parenchymal changes noted. Hyperinflation compatible with COPD. 

 

Strandy basilar atelectasis or developing infiltrates.

 

Heart size is stable.

 

Mediastinal structures are stable and grossly unremarkable.

 

No evidence for hilar prominence.

 

Degenerative changes dorsal spine. 

 

IMPRESSION:

1. Strandy basilar atelectasis or developing infiltrates.

## 2022-06-24 NOTE — P.PN
Subjective


Progress Note Date: 06/24/22


Principal diagnosis: 


Primary osteoarthritis right hip.


Status post total right hip arthroplasty with direct anterior approach.





This is a 76-year-old female who is postoperative day #3 status post total hip 

arthroplasty was direct anterior approach.  She has been able to void without 

difficulty.  She has been on O2 but her sats are improving.  She has no new 

complaints or concerns today. Vital signs are stable.





Objective





- Vital Signs


Vital signs: 


                                   Vital Signs











Temp  98.5 F   06/24/22 08:00


 


Pulse  90   06/24/22 08:00


 


Resp  19   06/24/22 08:00


 


BP  138/64   06/24/22 08:00


 


Pulse Ox  96   06/24/22 08:00


 


FiO2      








                                 Intake & Output











 06/23/22 06/24/22 06/24/22





 18:59 06:59 18:59


 


Output Total  350 


 


Balance  -350 


 


Output:   


 


  Urine  350 


 


Other:   


 


  # Voids 3 1 














- Exam


This is a pleasant 76-year-old female in no acute distress.  She is alert and 

oriented 3.  Exam of the right hip reveals that her dressings clean, dry and 

intact.  She has full foot and ankle motion without difficulty or pain.  

Neurovascular status to the right lower extremity is intact.








- Labs


CBC & Chem 7: 


                                 06/22/22 06:08





Labs: 


                  Abnormal Lab Results - Last 24 Hours (Table)











  06/23/22 06/23/22 06/23/22 Range/Units





  11:51 17:25 21:26 


 


POC Glucose (mg/dL)  322 H  305 H  318 H  ()  mg/dL














  06/24/22 06/24/22 Range/Units





  00:35 07:44 


 


POC Glucose (mg/dL)  253 H  198 H  ()  mg/dL














Assessment and Plan


(1) Primary localized osteoarthritis of right hip


Current Visit: Yes   Status: Acute   Code(s): M16.11 - UNILATERAL PRIMARY 

OSTEOARTHRITIS, RIGHT HIP   SNOMED Code(s): 492167207996212


   





(2) Status post total hip replacement, right


Current Visit: Yes   Status: Acute   Code(s): Z96.641 - PRESENCE OF RIGHT 

ARTIFICIAL HIP JOINT   SNOMED Code(s): 242141406769


   


Plan: 


the clinical findings are discussed with the patient.  I have discussed the case

with Dr. Culp.  I've ordered a chest x-ray which he will review.  Possible 

discharge to rehab today if cleared medically.

## 2022-06-24 NOTE — P.DS
Providers


Date of admission: 


06/22/22 14:19





Expected date of discharge: 06/23/22


Attending physician: 


Manuel Grant





Consults: 





                                        





06/21/22 11:07


Consult Physician Routine 


   Consulting Provider: Valdemar Culp


   Consult Reason/Comments: medical management


   Do you want consulting provider notified?: Yes











Primary care physician: 


Mike Junior








- Discharge Diagnosis(es)


(1) Primary localized osteoarthritis of right hip


Current Visit: Yes   Status: Acute   





(2) Status post total hip replacement, right


Current Visit: Yes   Status: Acute   


Hospital Course: 


This is a 76-year-old female with known history of degenerative arthritis of the

right hip.  The patient presents for evaluation.  After discussion and 

consideration patient elects to proceed with total  hip arthroplasty with direct

anterior approach.


The patient is seen preoperatively by primary care physician and cleared for 

surgery.





Patient is admitted to [facility] on 06/21/2022 for total hip 

arthroplasty with direct anterior approach.  The procedure is performed without 

complication or sequelae.  On postoperative day 1 she had difficulty with 

urination.  She did have some urinary retention which is resolving on 

postoperative day 2.  She has been able to void on her own.  The patient is 

doing well postoperatively.  Labs and vital signs are stable on day of 

discharge.





On day of discharge patient's hip incision is healing well.  There is minimal 

erythema.  There is no drainage noted at this time.  There is minimal soft 

tissue swelling to the hip and thigh.  Patient has full foot and ankle motion 

without difficulty or pain.  Neurovascular status to the lower extremity is 

intact. She has been having some shortness of breath and her O2 sats dropping 

with ambulation but that is improving today.





Patient is discharged to rehab in stable condition.  Please see med rec for 

accurate list of home medications.  Medical will manage her anticoagulation.  

Patient is on Plavix.








Patient Condition at Discharge: Stable





Plan - Discharge Summary


Discharge Rx Participant: No


New Discharge Prescriptions: 


New


   Sennosides-Docusate Sodium [Senokot-S] 2 tab PO HS PRN #30 tablet


     PRN Reason: Constipation


   Ondansetron Odt [Zofran Odt] 4 mg PO Q8HR PRN #10 tab


     PRN Reason: Nausea


   Ferrous Sulfate [Feosol] 325 mg PO BID #1 tab


   Enoxaparin [Lovenox] 30 mg SQ Q12H #20 each


   Budesonide [Pulmicort Flexhaler] 1 puff INHALATION BID #1 each


   HYDROcodone/APAP 5-325MG [Norco 5-325] 1 - 2 tab PO Q6HR PRN 7 Days #30 tab


     PRN Reason: Pain


   Tamsulosin [Flomax] 0.4 mg PO PC-BRKFST  cap


   Ipratropium-Albuterol Nebulize [Duoneb 0.5 mg-3 mg/3 ml Soln] 3 ml INHALATION

TID #1 ml





Continue


   Clopidogrel [Plavix] 75 mg PO DAILY


   Glimepiride [Amaryl] 2 mg PO BID


   Citalopram Hydrobromide [Citalopram HBr] 20 mg PO DAILY


   Fenofibrate 160 mg PO DAILY


   Rosuvastatin [Crestor] 40 mg PO HS


   Dulaglutide [Trulicity] 0.75 mg SQ [facility]


   Multivitamin [Multivitamins Adult Gummies] 1 each PO DAILY





Changed


   Lisinopril-Hctz 20-12.5 mg [Zestoretic 20-12.5] 1 tab PO HS #0


   Metoprolol Tartrate 12.5 mg PO BID #0





Discontinued


   Enoxaparin [Lovenox] 40 mg SQ Q12H


Discharge Medication List





Citalopram Hydrobromide [Citalopram HBr] 20 mg PO DAILY 06/17/22 [History]


Clopidogrel [Plavix] 75 mg PO DAILY 06/17/22 [History]


Dulaglutide [Trulicity] 0.75 mg SQ LORA 06/17/22 [History]


Fenofibrate 160 mg PO DAILY 06/17/22 [History]


Glimepiride [Amaryl] 2 mg PO BID 06/17/22 [History]


Multivitamin [Multivitamins Adult Gummies] 1 each PO DAILY 06/17/22 [History]


Rosuvastatin [Crestor] 40 mg PO HS 06/17/22 [History]


HYDROcodone/APAP 5-325MG [Norco 5-325] 1 - 2 tab PO Q6HR PRN 7 Days #30 tab 

06/21/22 [Rx]


Ondansetron Odt [Zofran Odt] 4 mg PO Q8HR PRN #10 tab 06/21/22 [Rx]


Sennosides-Docusate Sodium [Senokot-S] 2 tab PO HS PRN #30 tablet 06/21/22 [Rx]


Enoxaparin [Lovenox] 30 mg SQ Q12H #20 each 06/22/22 [Rx]


Ferrous Sulfate [Feosol] 325 mg PO BID #1 tab 06/22/22 [Rx]


Lisinopril-Hctz 20-12.5 mg [Zestoretic 20-12.5] 1 tab PO HS #0 06/22/22 [Rx]


Tamsulosin [Flomax] 0.4 mg PO PC-BRKFST  cap 06/22/22 [Rx]


Budesonide [Pulmicort Flexhaler] 1 puff INHALATION BID #1 each 06/24/22 [Rx]


Ipratropium-Albuterol Nebulize [Duoneb 0.5 mg-3 mg/3 ml Soln] 3 ml INHALATION 

TID #1 ml 06/24/22 [Rx]


Metoprolol Tartrate 12.5 mg PO BID #0 06/24/22 [Rx]








Follow up Appointment(s)/Referral(s[facility] 


[facility] Needed


Manuel Grant DO [Doctor of Osteopathic Medicine] - 07/06/22 2:00 pm


Sahara Brito, SEAN [REFERRING] - 1 Week


Activity/Diet/Wound Care/Special Instructions: 


Weight bear as tolerated on operative leg with a walker. 


Keep operative dressing intact for 7-10 days. May shower with dressing on. 


Take pain medications as needed. Resume Plavix for DVT prophylaxis. 


Follow-up in the office in 2 weeks with Dr. Grant. 


Call the office with any questions or concerns, [phone]


Discharge Disposition: TRANSFER TO SNF/ECF

## 2022-06-24 NOTE — P.PN
Progress Note - Text


Progress Note Date: 06/24/22





- Chief Complaint


Right hip surgery








Hospital course


This is a pleasant 75-year-old patient follows with Dr. Junior.  Chronic stable 

medical conditions include right-sided weakness and right foot drag from prior 

stroke, diabetes, hypertension, hyperlipidemia, osteoarthritis.


Patient has undergone right total hip arthroplasty.  Pain control.  No nausea 

vomiting.  No chest pain or shortness of breath.  Laying in bed.


June 22: Patient had urinary retention for which to check were Fishman catheter.  

Discontinued.  Flomax added.  Spoke to Shannan Hernandez from orthopedic team.  Plavix

has been resumed.  For DVT prophylaxis we will add subcu Lovenox.  Care was 

discussed with the family at the bedside.


June 23: Patient started on Flomax yesterday.  Making urine.  Oral intake fair. 

Pain control.


June 24: Patient able to make urine.  On Flomax.  Pulse ox 92% with activity.  

Patient ex-smoker.  Some wheezing.  DuoNeb inhaled steroids added.  Communicated

with Shannan Hernandez from orthopedics.  Patient stable for DC.  Discussed with the 

nurse.





Active Medications





Hydrocodone Bitart/Acetaminophen (Hydrocodone/Apap 5-325mg 1 Each Tab)  1 each 

PO Q6HR PRN


   PRN Reason: Pain Scale 1 to 5


   Stop: 07/21/22 11:08


   Last Admin: 06/24/22 05:46 Dose:  1 each


   


Hydrocodone Bitart/Acetaminophen (Hydrocodone/Apap 5-325mg 1 Each Tab)  2 each 

PO Q6HR PRN


   PRN Reason: Pain Scale 6 to 10


   Stop: 07/21/22 11:08


Albuterol/Ipratropium (Ipratropium-Albuterol 3 Ml Neb)  3 ml INHALATION RT-TID 

[facility]


   Last Admin: 06/24/22 12:30 Dose:  3 ml


   


Atorvastatin Calcium (Atorvastatin 80 Mg Tab)  80 mg PO HS [facility]


   Last Admin: 06/23/22 20:37 Dose:  80 mg


   


Citalopram Hydrobromide (Citalopram Hydrobromide 20 Mg Tab)  20 mg PO DAILY [facility]


   Last Admin: 06/24/22 08:40 Dose:  20 mg


   


Enoxaparin Sodium (Enoxaparin 30 Mg/0.3 Ml Syringe)  30 mg SQ Q12HR [facility]


   Last Admin: 06/24/22 08:40 Dose:  30 mg


   


Fenofibrate (Fenofibrate 160 Mg Tab)  160 mg PO DAILY [facility]


   Last Admin: 06/24/22 08:40 Dose:  160 mg


   


Ferrous Sulfate (Ferrous Sulfate 325 Mg Tab)  325 mg PO BID-W/MEALS [facility]


   Last Admin: 06/24/22 08:40 Dose:  325 mg


   


Glimepiride (Glimepiride 2 Mg Tab)  2 mg PO BID [facility]


   Last Admin: 06/24/22 08:43 Dose:  2 mg


   


Hydromorphone HCl (Hydromorphone 0.5 Mg/0.5 Ml Syringe)  0.125 mg IVP Q3HR PRN


   PRN Reason: Pain Scale 1 to 3


   Stop: 07/21/22 11:08


Hydromorphone HCl (Hydromorphone 0.5 Mg/0.5 Ml Syringe)  0.5 mg IVP Q3HR PRN


   PRN Reason: Pain Scale 7 to 10


   Stop: 07/21/22 11:08


   Last Admin: 06/22/22 17:12 Dose:  0.5 mg


   


Hydromorphone HCl (Hydromorphone 0.5 Mg/0.5 Ml Syringe)  0.25 mg IVP Q3HR PRN


   PRN Reason: Pain Scale 4 to 6


   Stop: 07/21/22 11:08


Hydroxyzine Pamoate (Hydroxyzine Pamoate 25 Mg Cap)  25 mg PO Q4HR PRN


   PRN Reason: Nausea, Anxiety, Pain Control


   Stop: 07/21/22 11:08


   Last Admin: 06/22/22 15:02 Dose:  25 mg


   


Lactated Ringer's (Lactated Ringers)  1,000 mls @ 20 mls/hr IV .Q24H [facility]


   Stop: 07/21/22 05:56


   Last Admin: 06/24/22 08:38 Dose:  Not Given


   


Insulin Aspart (Insulin Aspart (Novolog) 100 Unit/Ml Vial)  0 unit SQ ACHS [facility]; 

Protocol


   Last Admin: 06/24/22 12:55 Dose:  4 unit


   


Lidocaine HCl (Lidocaine 1% (10mg/Ml) For Iv Start)  0.1 ml INTRADERMA PER 

PROTOCOL PRN


   PRN Reason: IV Start


   Stop: 07/21/22 05:56


Metoprolol Tartrate (Metoprolol Tartrate 25 Mg Tab)  25 mg PO DAILY [facility]


   Last Admin: 06/24/22 08:40 Dose:  25 mg


   


Multivitamins (Multivitamins, Thera 1 Each Tab)  1 each PO DAILY [facility]


   Last Admin: 06/24/22 08:40 Dose:  1 each


   


Naloxone HCl (Naloxone 0.4 Mg/Ml 1 Ml Vial)  0.2 mg IV Q2M PRN


   PRN Reason: Opioid Reversal


   Stop: 07/21/22 11:08


Ondansetron HCl (Ondansetron 4 Mg/2 Ml Vial)  4 mg IVP Q6HR PRN


   PRN Reason: Nausea And Vomiting


   Stop: 07/21/22 11:08


   Last Admin: 06/22/22 17:12 Dose:  4 mg


   


Senna/Docusate Sodium (Sennosides-Docusate Sodium 1 Each Tab)  2 each PO HS [facility]


   Stop: 07/21/22 21:01


   Last Admin: 06/23/22 20:37 Dose:  2 each


   


Tamsulosin HCl (Tamsulosin 0.4 Mg Cap.Er.24h)  0.4 mg PO PC-BRKFST [facility]


   Last Admin: 06/24/22 08:40 Dose:  0.4 mg


   














Past medical history to include:


3 strokes loss and in 2010 resulting in some right-sided weakness right foot 

drag, diabetes, hypertension, hyperlipidemia, right breast cancer, 

osteoarthritis,





Social history:


Ex-smoker.  No alcohol.  .





Family history of colon


CAD, diabetes, breast cancer





Physical examination:


VITAL SIGNS: 98.5, 90, 16, 1 30 x 64, 92% on room air with activity


GENERAL: Laying in bed, awake, comfortable


EYES: Pupils equal.  Conjunctiva normal.


HEENT: External appearance of nose and ears normal, oral cavity grossly normal.


NECK: JVD not raised; masses not palpable.


HEART: First and second heart sounds are normal;  no edema.  


LUNGS: Respiratory rate normal; mild wheezing 


ABDOMEN: Soft,  nontender, liver spleen not palpable, no masses palpable.  


PSYCH: Alert and oriented x3;  mood  and affect normal.  


MUSCULOSKELETAL:No Clubbing/cyanosis;muscles-grossly intact.  Dressing over the 

right hip incision site.  Evidence of OA.


NEUROLOGICAL: [Cranial nerves grossly intact; no facial asymmetry, right leg 

weakness.








INVESTIGATIONS, reviewed in the clinical context:


June 24: Hemoglobin 9.3


June 22: White count 11.0 hemoglobin 10.4


Blood work from 06/10/2022:


White count 5.7 hemoglobin 13.7 platelets 200 potassium 4.2 creatinine 1.0





Assessment and plan:





-Right total hip arthroplasty with anterior approach


DVT prophylaxis with subcu Lovenox.  Pain control.





-Hyperlipidemia


Crestor 40 mg daily at bedtime





-Essential hypertension


Metoprolol 25 mg daily, Zestoretic 20/12.5 one tablet daily





-Diabetes mellitus type 2


Amaryl 2 mg by mouth twice a day, trulicity 0.75 mg subcu on Sunday





-Bladder dysfunction with retention: Improved


Flomax 0.4 mg daily 





-Depression


Celexa 20 mg daily





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


 Plavix





-Acute postprocedure blood loss anemia


Ferrous sulfate 1 tablet twice a day





-COPD in a previous smoker.  Patient smoked for many years


DuoNeb 3 times a day.  Inhaled steroids





Spoke to the nurse.  Pulse ox 92% on room air with activity.  Started on DuoNeb 

and inhaled steroids.  Stable for DC to ECF.


Thank you Dr. Grant

## 2022-07-21 ENCOUNTER — HOSPITAL ENCOUNTER (OUTPATIENT)
Dept: HOSPITAL 47 - RADMAMWWP | Age: 77
Discharge: HOME | End: 2022-07-21
Attending: FAMILY MEDICINE
Payer: MEDICARE

## 2022-07-21 DIAGNOSIS — R92.8: Primary | ICD-10-CM

## 2022-07-21 PROCEDURE — 77065 DX MAMMO INCL CAD UNI: CPT

## 2022-07-21 PROCEDURE — 77061 BREAST TOMOSYNTHESIS UNI: CPT

## 2022-07-21 NOTE — MM
Reason for Exam: Additional evaluation requested from abnormal screening. 

Last screening mammogram was performed 2 month(s) ago.





Patient History: 

Menarche at age 14. First Full-Term Pregnancy at age 27. Hysterectomy at age 47. Postmenopausal.

Breast cancer, right, age 58.

Mother had breast cancer. Sister had breast cancer. 





Prior Study Comparison: 

3/15/2019 Bilateral MG 3D screening mammo w/cad, [place]. 3/9/2021 Bilateral MG 3D diag mammo w/cad

[place] [address], [place]. 5/16/2022 Bilateral MG 3D screening mammo w/cad, [facility]. 





Tissue Density: 

Left: The breast tissue is heterogeneously dense. This may lower the sensitivity of mammography.





Findings: 

Analyzed By CAD. 

We note that priors have arrived from 3/9/2021 and 3/15/2019. The spiculated density posterior upper

outer quadrant right breast is unchanged and is therefore compatible with patient's lumpectomy scar.



The question superior anterior asymmetric density on the left MLO view disperses on additional

views. The posterior superior loosely grouped microcalcifications in the left breast slightly more

pronounced on the spot view. We recommend a 6 month follow-up to reassess these calcifications. 





Overall Assessment: Probably benign, BI-RAD 3





Management: 

Diagnostic Mammogram of the left breast in 6 months.

1. Six-month follow-up diagnostic left breast mammogram to reassess the loosely grouped posterior

calcifications.

2. Patient should continue monthly self breast exam.

3. This exam should not preclude additional follow-up of suspicious palpable abnormalities.



Results were given to the patient verbally at the time of exam.



Electronically signed and approved by: Juli Thompson M.D. Radiologist

## 2022-11-07 ENCOUNTER — HOSPITAL ENCOUNTER (OUTPATIENT)
Dept: HOSPITAL 47 - RADBDWWP | Age: 77
Discharge: HOME | End: 2022-11-07
Attending: FAMILY MEDICINE
Payer: MEDICARE

## 2022-11-07 DIAGNOSIS — Z78.0: ICD-10-CM

## 2022-11-07 DIAGNOSIS — M85.89: Primary | ICD-10-CM

## 2022-11-07 DIAGNOSIS — Z85.3: ICD-10-CM

## 2022-11-07 PROCEDURE — 77080 DXA BONE DENSITY AXIAL: CPT

## 2022-11-07 NOTE — BD
EXAMINATION TYPE: Axial Bone Density

 

DATE OF EXAM: 11/7/2022

 

COMPARISON: NEW TO [facility]

 

CLINICAL HISTORY: 77 years old Female.  ICD-10 CODE: M81.0 Age-related osteoporosis without current p
at

 

Height:  60.25

Weight:  159

 

FRAX RISK QUESTIONS:

History of Fracture in Adulthood: YES

Current Tobacco Use: NO

 

RISK FACTORS 

HISTORY OF: 

Spine Fracture: NOT TO LUMBAR BUT SACRUM

When: 09/2022

Surgery to Hip(right/left): YES BOTH

When: RIGHT 2 MONTHS AGO, LEFT 20 YEARS AGO

Family History of Osteoporosis: NO

Active: NO

Diet low in dairy products/other sources of calcium:  NNO

Postmenopausal woman: YES

Lost more than 2 inches in height since high school: NO

Frequent falls: YES

Poor Health: NO

 

MEDICATIONS: 

Additional Medications: YES

TRULICTY,DIABETIC MEDS,TAMOXIFEN

 

Additional History: BREAST CANCER 2006

RADIATION 

 

EXAM MEASUREMENTS: 

Bone mineral densitometry was performed using the [employer].

Bone mineral density as measured about the Lumbar spine is:  

----- L1-L4(G/cm2): 1.327

T Score Values are as follows:

----- L1: 0.1

----- L2: 1.5

----- L3: 1.4

----- L4: 1.4

----- L1-L4: 1.2

Bone mineral density BASELINE

 

 

Bone mineral density about the L Wrist (g/cm2): 0.597

T Score values are as follows: 

-----Dist. R+U: 0.0

-----Prox. R+U: -1.6

-----Radius total: -1.1

Bone mineral density BASELINE

 

NO FRAX HIPS NOT DONE

 

IMPRESSION:

Osteopenia (T Score between -2.5 and -1).

 

There is slightly increased risk of fracture and the patient may be considered 

for treatment. 

 

Re-Screen 2-5 years.

 

NOTE:  T-SCORE=SD OF THE YOUNG ADULT MEAN.

## 2023-03-17 ENCOUNTER — HOSPITAL ENCOUNTER (OUTPATIENT)
Dept: HOSPITAL 47 - OR | Age: 78
Discharge: HOME | End: 2023-03-17
Attending: PODIATRIST
Payer: MEDICARE

## 2023-03-17 VITALS — TEMPERATURE: 97.3 F

## 2023-03-17 VITALS — RESPIRATION RATE: 20 BRPM

## 2023-03-17 VITALS — SYSTOLIC BLOOD PRESSURE: 148 MMHG | HEART RATE: 70 BPM | DIASTOLIC BLOOD PRESSURE: 75 MMHG

## 2023-03-17 DIAGNOSIS — M24.574: Primary | ICD-10-CM

## 2023-03-17 LAB
GLUCOSE BLD-MCNC: 69 MG/DL (ref 70–110)
GLUCOSE BLD-MCNC: 77 MG/DL (ref 70–110)
GLUCOSE BLD-MCNC: 81 MG/DL (ref 70–110)

## 2023-03-17 PROCEDURE — 28225 RELEASE OF FOOT TENDON: CPT

## 2023-10-25 ENCOUNTER — HOSPITAL ENCOUNTER (OUTPATIENT)
Dept: HOSPITAL 47 - RADXRMAIN | Age: 78
Discharge: HOME | End: 2023-10-25
Attending: FAMILY MEDICINE
Payer: MEDICARE

## 2023-10-25 DIAGNOSIS — Z91.81: ICD-10-CM

## 2023-10-25 DIAGNOSIS — M47.815: Primary | ICD-10-CM

## 2023-10-25 DIAGNOSIS — M25.551: ICD-10-CM

## 2023-10-25 PROCEDURE — 72080 X-RAY EXAM THORACOLMB 2/> VW: CPT

## 2023-10-25 PROCEDURE — 73502 X-RAY EXAM HIP UNI 2-3 VIEWS: CPT

## 2023-10-25 NOTE — XR
EXAMINATION TYPE: XR Hip Complete RT

 

DATE OF EXAM: 10/25/2023

 

COMPARISON: 6/21/2022

 

HISTORY: Falling, pain

 

TECHNIQUE: 2 view right hip

 

FINDINGS: There is a femoral prosthesis with acetabular component. No acute fracture or dislocation i
s evident. Vascular calcification is noted. No significant interval change.

 

IMPRESSION:

1.  No acute osseous abnormality with a right hip prosthesis remaining in position.

## 2023-10-25 NOTE — XR
EXAMINATION TYPE: XR thoraco lumbar junction

 

DATE OF EXAM: 10/25/2023

 

COMPARISON: None

 

HISTORY: History of falling, pain

 

TECHNIQUE: Thoracolumbar spine region is examined in 2 projections. 

 

FINDINGS: Note is made of large spurs in the lower lumbar spine anteriorly. Disc heights appear prese
rved. Vertebral body heights are preserved. Alignment is unremarkable. Pedicles appear intact. Spondy
losis is evident in the frontal projection.

 

IMPRESSION:

1.  Spondylosis.

2. No acute osseous abnormality thoracolumbar region